# Patient Record
Sex: MALE | Race: OTHER | HISPANIC OR LATINO | URBAN - METROPOLITAN AREA
[De-identification: names, ages, dates, MRNs, and addresses within clinical notes are randomized per-mention and may not be internally consistent; named-entity substitution may affect disease eponyms.]

---

## 2019-10-04 ENCOUNTER — EMERGENCY (EMERGENCY)
Facility: HOSPITAL | Age: 29
LOS: 1 days | Discharge: HOME | End: 2019-10-04
Attending: EMERGENCY MEDICINE
Payer: SELF-PAY

## 2019-10-04 VITALS
WEIGHT: 179.9 LBS | RESPIRATION RATE: 16 BRPM | TEMPERATURE: 98 F | OXYGEN SATURATION: 99 % | SYSTOLIC BLOOD PRESSURE: 128 MMHG | HEIGHT: 64 IN | DIASTOLIC BLOOD PRESSURE: 79 MMHG | HEART RATE: 70 BPM

## 2019-10-04 PROCEDURE — 99282 EMERGENCY DEPT VISIT SF MDM: CPT

## 2019-10-04 PROCEDURE — 99283 EMERGENCY DEPT VISIT LOW MDM: CPT

## 2019-10-04 NOTE — ED PROVIDER NOTE - CHIEF COMPLAINT
The patient is a 29y Male complaining of see chief complaint quote. The patient is a 29y Male complaining of epistaxis.

## 2019-10-04 NOTE — ED PROVIDER NOTE - OBJECTIVE STATEMENT
28 y/o with PMHx/PSHx presenting to ED for sudden onset of L epistaxis s/p returning from work. Pt relates  blowing his nose which triggered the nose bleeding. Pt notes that episode resolved after using tissues to plug his nostril. However, pt notes that as he was attempting to fishing 20 minutes later he experienced another episode of sudden onset of epistaxis. Pt also relates difficulty breathing and fatigue. Pt denies SOB, CP, taking blood thinners or any  other acute complaints. NKDA. 28 y/o with PMHx/PSHx presenting to ED for sudden onset of L epistaxis s/p returning from work. Pt reports blowing his nose which triggered the nose bleeding. Pt notes that episode resolved after using tissues to plug his nostril. However, pt notes that as he was fishing 20 minutes later he experienced another episode of sudden onset of epistaxis. Pt denies SOB, CP, taking blood thinners or any other acute complaints. NKDA.

## 2019-10-04 NOTE — ED ADULT NURSE NOTE - NSIMPLEMENTINTERV_GEN_ALL_ED
Implemented All Universal Safety Interventions:  [place] to call system. Call bell, personal items and telephone within reach. Instruct patient to call for assistance. Room bathroom lighting operational. Non-slip footwear when patient is off stretcher. Physically safe environment: no spills, clutter or unnecessary equipment. Stretcher in lowest position, wheels locked, appropriate side rails in place.

## 2019-10-04 NOTE — ED PROVIDER NOTE - CLINICAL SUMMARY MEDICAL DECISION MAKING FREE TEXT BOX
29M with epistaxis, that is now resolved  -as the epstaxis is resolved and the patient has normal vital signs there is no emergent medical condition at this time so he can be discharged

## 2019-10-04 NOTE — ED ADULT NURSE NOTE - OBJECTIVE STATEMENT
pt is a 30 y/o male with c/o nose bleeding after he blew his nose at 5 pm tonight then bleeding after an hour.

## 2019-10-04 NOTE — ED ADULT TRIAGE NOTE - CHIEF COMPLAINT QUOTE
" I blow my nose and I started to bleed at 5;00pm today, it stopped half an hour later " No bleeding at this time, denies injury, denies medications for blood thinner

## 2019-10-04 NOTE — ED PROVIDER NOTE - PATIENT PORTAL LINK FT
You can access the FollowMyHealth Patient Portal offered by [facility] [place] by registering at the following website: [email or web address]. By joining [employer]’s FollowMyHealth portal, you will also be able to view your health information using other applications (apps) compatible with our system.
